# Patient Record
Sex: FEMALE | Race: WHITE | NOT HISPANIC OR LATINO | Employment: FULL TIME | URBAN - METROPOLITAN AREA
[De-identification: names, ages, dates, MRNs, and addresses within clinical notes are randomized per-mention and may not be internally consistent; named-entity substitution may affect disease eponyms.]

---

## 2017-05-12 ENCOUNTER — COMMUNICATION - HEALTHEAST (OUTPATIENT)
Dept: SCHEDULING | Facility: CLINIC | Age: 42
End: 2017-05-12

## 2017-05-24 ENCOUNTER — OFFICE VISIT - HEALTHEAST (OUTPATIENT)
Dept: INTERNAL MEDICINE | Facility: CLINIC | Age: 42
End: 2017-05-24

## 2017-05-24 DIAGNOSIS — G43.109 MIGRAINE HEADACHE WITH AURA: ICD-10-CM

## 2017-05-24 DIAGNOSIS — R10.9 ABDOMINAL PAIN: ICD-10-CM

## 2017-05-24 DIAGNOSIS — R63.4 WEIGHT LOSS: ICD-10-CM

## 2017-05-24 DIAGNOSIS — Z12.31 ENCOUNTER FOR SCREENING MAMMOGRAM FOR BREAST CANCER: ICD-10-CM

## 2017-05-24 DIAGNOSIS — K59.00 CONSTIPATION: ICD-10-CM

## 2017-05-24 DIAGNOSIS — Z00.00 ANNUAL PHYSICAL EXAM: ICD-10-CM

## 2017-05-24 DIAGNOSIS — E55.9 VITAMIN D DEFICIENCY: ICD-10-CM

## 2017-05-24 DIAGNOSIS — K64.9 HEMORRHOID: ICD-10-CM

## 2017-05-24 ASSESSMENT — MIFFLIN-ST. JEOR: SCORE: 969.07

## 2017-05-30 ENCOUNTER — COMMUNICATION - HEALTHEAST (OUTPATIENT)
Dept: INTERNAL MEDICINE | Facility: CLINIC | Age: 42
End: 2017-05-30

## 2017-05-30 ENCOUNTER — HOSPITAL ENCOUNTER (OUTPATIENT)
Dept: MAMMOGRAPHY | Facility: HOSPITAL | Age: 42
Discharge: HOME | End: 2017-05-30
Attending: INTERNAL MEDICINE

## 2017-05-30 DIAGNOSIS — Z12.31 ENCOUNTER FOR SCREENING MAMMOGRAM FOR BREAST CANCER: ICD-10-CM

## 2017-06-27 ENCOUNTER — RECORDS - HEALTHEAST (OUTPATIENT)
Dept: ADMINISTRATIVE | Facility: OTHER | Age: 42
End: 2017-06-27

## 2017-06-28 ENCOUNTER — RECORDS - HEALTHEAST (OUTPATIENT)
Dept: ADMINISTRATIVE | Facility: OTHER | Age: 42
End: 2017-06-28

## 2017-08-08 ENCOUNTER — RECORDS - HEALTHEAST (OUTPATIENT)
Dept: ADMINISTRATIVE | Facility: OTHER | Age: 42
End: 2017-08-08

## 2017-08-14 ENCOUNTER — COMMUNICATION - HEALTHEAST (OUTPATIENT)
Dept: INTERNAL MEDICINE | Facility: CLINIC | Age: 42
End: 2017-08-14

## 2017-08-25 ENCOUNTER — COMMUNICATION - HEALTHEAST (OUTPATIENT)
Dept: INTERNAL MEDICINE | Facility: CLINIC | Age: 42
End: 2017-08-25

## 2017-09-05 ENCOUNTER — COMMUNICATION - HEALTHEAST (OUTPATIENT)
Dept: INTERNAL MEDICINE | Facility: CLINIC | Age: 42
End: 2017-09-05

## 2021-05-24 ENCOUNTER — RECORDS - HEALTHEAST (OUTPATIENT)
Dept: ADMINISTRATIVE | Facility: CLINIC | Age: 46
End: 2021-05-24

## 2021-05-25 ENCOUNTER — RECORDS - HEALTHEAST (OUTPATIENT)
Dept: ADMINISTRATIVE | Facility: CLINIC | Age: 46
End: 2021-05-25

## 2021-05-29 ENCOUNTER — RECORDS - HEALTHEAST (OUTPATIENT)
Dept: ADMINISTRATIVE | Facility: CLINIC | Age: 46
End: 2021-05-29

## 2021-05-31 ENCOUNTER — RECORDS - HEALTHEAST (OUTPATIENT)
Dept: ADMINISTRATIVE | Facility: CLINIC | Age: 46
End: 2021-05-31

## 2021-05-31 VITALS — HEIGHT: 59 IN | WEIGHT: 92.25 LBS | BODY MASS INDEX: 18.6 KG/M2

## 2021-06-03 ENCOUNTER — RECORDS - HEALTHEAST (OUTPATIENT)
Dept: ADMINISTRATIVE | Facility: CLINIC | Age: 46
End: 2021-06-03

## 2021-06-10 NOTE — PROGRESS NOTES
[facility] Follow Up Note    Assessment/Plan:    1. Annual physical exam  Patient is up-to-date on Pap smear which was done in 2016 and was negative together was negative HPV.  She refused tetanus shot today.  Mammogram was ordered.  - Mammo Screening Bilateral; Future    2.  New onset constipation and weight loss  This is concerning.  Will check metabolic workup.  Since she has never had constipation before no change in her lifestyle or stress levels we did discuss doing a colonoscopy.  - HM1(CBC and Differential)  - Thyroid Stimulating Hormone (TSH)  - HM1 (CBC with Diff)  - Ambulatory referral for Colonoscopy    3. Hemorrhoid  She will use topical hydrocortisone cream as needed.  For constipation discussed using MiraLAX.  - HM1(CBC and Differential)  - HM1 (CBC with Diff)  - hydrocortisone 2.5 % cream; Apply twice a day as needed for 3-5 days  Dispense: 30 g; Refill: 0    4. Migraine headache with aura  Patient uses ibuprofen as needed    5.  Anxiety:  Patient looks very anxious and preoccupied.  Her  was in the room but did not encroach in our conversation.  She was not menacing but in the future would like to question her by herself to make sure she is safe at home.  She has been  for over 20 years and has 3 kids.  She also has a very low BMI and if blood work and colonoscopy are normal we can further discuss eating disorder.    Beronica Coffey MD    Chief Complaint:  Chief Complaint   Patient presents with     Establish Care     Hemorrhoids       History of Present Illness:  Francisco is a 41 y.o. female with history of tubal ligation, and migraines who is currently here to establish care and address the concern.  She is accompanied by her  and appears to be somewhat anxious and stressed.    Patient never had any gastrointestinal problems in the past and most recently developed hemorrhoid.  She describes occasional pain and blood was moving her bowels.  She does not know her  family history of very well but so far denies any family history of colon cancer.  She has never had hemorrhoids before.  She is also been constipated more for the last several months.  That has never happened before either.  She denies any new medications or change in diet.  She has intermittent hard stool but sometimes she has runny stools and she has bowel urgency to push for it to come out.  Over-the-counter she tried Preparation H for hemorrhoids but no laxatives.  Because of these problems she has not been eating very well and lost 9 pounds.  She is underweight to begin with.    Patient has history of migraines with aura (flashing lights.  She has been having them more frequently recently but they do respond well to ibuprofen.    Review of Systems:  A comprehensive review of systems was performed and was otherwise negative.  Patient has only occasional insomnia.  No recent fevers or chills.  Weight loss as above.  She tells me that she is stress only because of her health issues but otherwise denies any chronic depression or anxiety.  No chest pains palpitations or dizziness.  No shortness of breath or cough.  No urinary problems.  Her menstruations are regular and heavy only on the first 2 days.    PFSH:  Social History: Reviewed  History   Smoking Status     Never Smoker   Smokeless Tobacco     Not on file     Social History     Social History Narrative    Patient works as a .  She has 3 teenage children who still live in the house.  She is  for over 20 years.           Past History: Reviewed  Current Outpatient Prescriptions   Medication Sig Dispense Refill     hydrocortisone 2.5 % cream Apply twice a day as needed for 3-5 days 30 g 0     No current facility-administered medications for this visit.        Family History: Patient is not a very good historian but did reviewed with her    Physical Exam:    Vitals:    05/24/17 1234   BP: 96/64   Patient Site: Left Arm   Patient Position: Sitting  "  Cuff Size: Adult Regular   Pulse: 61   SpO2: 98%   Weight: (!) 92 lb 4 oz (41.8 kg)   Height: 4' 11\" (1.499 m)     Wt Readings from Last 3 Encounters:   05/24/17 (!) 92 lb 4 oz (41.8 kg)   11/20/16 (!) 98 lb (44.5 kg)   10/18/16 100 lb (45.4 kg)     Body mass index is 18.63 kg/(m^2).    Constitutional:  Reveals a pleasant frail female with anxious presentation.  Vitals:  Per nursing notes.  HEENT:No cervical LAD, no thyromegaly,  conjunctiva is pink, no scleral icterus, TMs are visualized and normal bl, oropharynx is clear, no exudates,   Cardiac:  Regular rate and rhythm,no murmurs, rubs, or gallops. Legs without edema. Palpation of the radial pulse regular.  Lungs: Clear to auscultation bl.  Respiratory effort normal.  Abdomen:positive BS, soft, nontender, nondistended.  No hepato-splenomagaly, mild epigastric tenderness without rebound.  Rectal exam: Moderate size deflated hemorrhoid, increased sphincter tone, no stool in the vault.  Skin:   Without rash, bruise, or palpable lesions.  Breast exam: Toney lymphadenopathy breast masses or skin changes appreciated.  Patient has somewhat fibrocystic breasts.  Psychiatric: affect is somewhat anxious and fearful, her  was in the room and was not encroaching on our conversation, memory intact.     Data Review:    Analysis and Summary of Old Records (2): Yes    Records Requested (1): No    Other History Summarized (from other people in the room) (2): No    Radiology Tests Summarized (XRAY/CT/MRI/DXA) (1): No    Labs Reviewed (1): Yes    Medicine Tests Reviewed (EKG/ECHO/COLONOSCOPY/EGD) (1): No    Independent Review of EKG or X-RAY (2): No      "

## 2022-09-17 ENCOUNTER — HOSPITAL ENCOUNTER (EMERGENCY)
Facility: CLINIC | Age: 47
Discharge: HOME | End: 2022-09-17
Attending: EMERGENCY MEDICINE | Admitting: EMERGENCY MEDICINE

## 2022-09-17 VITALS
BODY MASS INDEX: 19.44 KG/M2 | HEIGHT: 59 IN | WEIGHT: 96.4 LBS | HEART RATE: 86 BPM | DIASTOLIC BLOOD PRESSURE: 56 MMHG | TEMPERATURE: 99.3 F | OXYGEN SATURATION: 95 % | RESPIRATION RATE: 20 BRPM | SYSTOLIC BLOOD PRESSURE: 97 MMHG

## 2022-09-17 DIAGNOSIS — N10 ACUTE PYELONEPHRITIS: ICD-10-CM

## 2022-09-17 LAB
ALBUMIN UR-MCNC: 70 MG/DL
APPEARANCE UR: ABNORMAL
BACTERIA #/AREA URNS HPF: ABNORMAL /HPF
BILIRUB UR QL STRIP: NEGATIVE
COLOR UR AUTO: YELLOW
FLUAV RNA SPEC QL NAA+PROBE: NEGATIVE
FLUBV RNA RESP QL NAA+PROBE: NEGATIVE
GLUCOSE UR STRIP-MCNC: NEGATIVE MG/DL
HGB UR QL STRIP: ABNORMAL
HOLD SPECIMEN: NORMAL
KETONES UR STRIP-MCNC: 10 MG/DL
LEUKOCYTE ESTERASE UR QL STRIP: ABNORMAL
MUCOUS THREADS #/AREA URNS LPF: PRESENT /LPF
NITRATE UR QL: POSITIVE
PH UR STRIP: 6.5 [PH] (ref 5–7)
RBC URINE: 48 /HPF
RSV RNA SPEC NAA+PROBE: NEGATIVE
SARS-COV-2 RNA RESP QL NAA+PROBE: NEGATIVE
SP GR UR STRIP: 1.02 (ref 1–1.03)
UROBILINOGEN UR STRIP-MCNC: <2 MG/DL
WBC CLUMPS #/AREA URNS HPF: PRESENT /HPF
WBC URINE: >182 /HPF

## 2022-09-17 PROCEDURE — 87186 SC STD MICRODIL/AGAR DIL: CPT | Performed by: EMERGENCY MEDICINE

## 2022-09-17 PROCEDURE — 81001 URINALYSIS AUTO W/SCOPE: CPT | Performed by: EMERGENCY MEDICINE

## 2022-09-17 PROCEDURE — C9803 HOPD COVID-19 SPEC COLLECT: HCPCS

## 2022-09-17 PROCEDURE — 96375 TX/PRO/DX INJ NEW DRUG ADDON: CPT

## 2022-09-17 PROCEDURE — 96361 HYDRATE IV INFUSION ADD-ON: CPT

## 2022-09-17 PROCEDURE — 96374 THER/PROPH/DIAG INJ IV PUSH: CPT

## 2022-09-17 PROCEDURE — 87637 SARSCOV2&INF A&B&RSV AMP PRB: CPT | Performed by: EMERGENCY MEDICINE

## 2022-09-17 PROCEDURE — 99285 EMERGENCY DEPT VISIT HI MDM: CPT | Mod: CS,25

## 2022-09-17 PROCEDURE — 250N000011 HC RX IP 250 OP 636: Performed by: EMERGENCY MEDICINE

## 2022-09-17 PROCEDURE — 258N000003 HC RX IP 258 OP 636: Performed by: EMERGENCY MEDICINE

## 2022-09-17 RX ORDER — PIPERACILLIN SODIUM, TAZOBACTAM SODIUM 3; .375 G/15ML; G/15ML
3.38 INJECTION, POWDER, LYOPHILIZED, FOR SOLUTION INTRAVENOUS ONCE
Status: COMPLETED | OUTPATIENT
Start: 2022-09-17 | End: 2022-09-17

## 2022-09-17 RX ORDER — MORPHINE SULFATE 4 MG/ML
4 INJECTION, SOLUTION INTRAMUSCULAR; INTRAVENOUS EVERY 30 MIN PRN
Status: DISCONTINUED | OUTPATIENT
Start: 2022-09-17 | End: 2022-09-17 | Stop reason: HOSPADM

## 2022-09-17 RX ORDER — ONDANSETRON 2 MG/ML
4 INJECTION INTRAMUSCULAR; INTRAVENOUS ONCE
Status: COMPLETED | OUTPATIENT
Start: 2022-09-17 | End: 2022-09-17

## 2022-09-17 RX ORDER — OXYCODONE AND ACETAMINOPHEN 5; 325 MG/1; MG/1
1 TABLET ORAL EVERY 6 HOURS PRN
Qty: 12 TABLET | Refills: 0 | Status: SHIPPED | OUTPATIENT
Start: 2022-09-17 | End: 2022-09-20

## 2022-09-17 RX ORDER — CEPHALEXIN 500 MG/1
500 CAPSULE ORAL 4 TIMES DAILY
Qty: 28 CAPSULE | Refills: 0 | Status: SHIPPED | OUTPATIENT
Start: 2022-09-17 | End: 2022-09-24

## 2022-09-17 RX ADMIN — MORPHINE SULFATE 4 MG: 4 INJECTION, SOLUTION INTRAMUSCULAR; INTRAVENOUS at 17:36

## 2022-09-17 RX ADMIN — PIPERACILLIN AND TAZOBACTAM 3.38 G: 3; .375 INJECTION, POWDER, FOR SOLUTION INTRAVENOUS at 17:47

## 2022-09-17 RX ADMIN — ONDANSETRON 4 MG: 2 INJECTION INTRAMUSCULAR; INTRAVENOUS at 17:35

## 2022-09-17 RX ADMIN — SODIUM CHLORIDE 1000 ML: 9 INJECTION, SOLUTION INTRAVENOUS at 17:35

## 2022-09-17 ASSESSMENT — ACTIVITIES OF DAILY LIVING (ADL): ADLS_ACUITY_SCORE: 33

## 2022-09-17 ASSESSMENT — ENCOUNTER SYMPTOMS
DYSURIA: 0
ARTHRALGIAS: 1
ABDOMINAL PAIN: 0
MYALGIAS: 1
FEVER: 1

## 2022-09-17 NOTE — ED TRIAGE NOTES
Patient reports 3 days of body aches, pain to ribs, head, and legs. Fever x2 days. On arrival 101.7. Has not had tylenol or ibuprofen today. Last emesis 2 days ago.  Urinary frequency     Triage Assessment     Row Name 09/17/22 1312       Triage Assessment (Adult)    Airway WDL WDL       Respiratory WDL    Respiratory WDL WDL       Skin Circulation/Temperature WDL    Skin Circulation/Temperature WDL WDL       Cardiac WDL    Cardiac WDL WDL       Peripheral/Neurovascular WDL    Peripheral Neurovascular WDL WDL       Cognitive/Neuro/Behavioral WDL    Cognitive/Neuro/Behavioral WDL WDL

## 2022-09-17 NOTE — Clinical Note
Francisco Alfredo was seen and treated in our emergency department on 9/17/2022.  She may return to work on 09/20/2022.       If you have any questions or concerns, please don't hesitate to call.      Johny Dexter MD

## 2022-09-17 NOTE — ED NOTES
Introduced self to patient.  Whiteboard updated.  Plan of care and length of time discussed with patient.  Will continue to monitor. Carolyn Carranza RN.......9/17/2022 6:42 PM

## 2022-09-17 NOTE — ED PROVIDER NOTES
EMERGENCY DEPARTMENT ENCOUNTER      NAME: Francisco Alfredo  AGE: 46 year old female  YOB: 1975  MRN: [id number]  EVALUATION DATE & TIME: 2022  4:48 PM    PCP: Lucy Arnett    ED PROVIDER: Johny Dexter M.D.      Chief Complaint   Patient presents with     Fever     Generalized Body Aches     Vomiting         FINAL IMPRESSION:  1.  Acute pyelonephritis.      ED COURSE & MEDICAL DECISION MAKIN:22 PM I met with the patient to gather history and to perform my initial exam. We discussed plans for the ED course, including diagnostic testing and treatment. PPE worn: cloth mask.  Patient notes frequency of urination, some nausea and vomiting 2 days ago.  Fever over the last 2 days and generalized achiness and back pain over the last 3 days.  Laboratory ordered from triage shows a negative influenza and COVID testing.  Patient however has a extensive urinary tract infection which is both nitrite leukocyte esterase positive and over 100 white cells.  Patient has an IV and we ordered IV fluids, Zofran and morphine, and 1 dose of antibiotics before we will discharge her to home.  Patient in agreement with the plan.  6:33 PM.  Antibiotic and IV fluids have infused.  Patient received received medications.  Patient feeling better and will be discharged to home.  Short-term prescription for Percocet, and prescription for cephalexin.  She is to see her clinic later on this next week and recheck her urine.  Patient in agreement with the plan.    Pertinent Labs & Imaging studies reviewed. (See chart for details)    46 year old female presents to the Emergency Department for evaluation of fever and back pain.    At the conclusion of the encounter I discussed the results of all of the tests and the disposition. The questions were answered. The patient or family acknowledged understanding and was agreeable with the care plan.     MEDICATIONS GIVEN IN THE EMERGENCY:  Medications - No data to  display    NEW PRESCRIPTIONS STARTED AT TODAY'S ER VISIT  New Prescriptions    No medications on file          =================================================================    HPI    Patient information was obtained from: Patient     Use of : N/A         Francisco Alfredo is a 46 year old female with no contributory history who presents to this ED via private vehicle for evaluation of body aches and fever.    Patient reports that she has had increased urgency and frequency to urinate as well as body aches and fever for the past 2-3 days. She notes that she has had nausea and a few episodes of vomiting (last occurrence was 2 days ago). Patient denies change of pregnancy. Patient denies dysuria, abdominal pain, or additional symptoms or complaints at this time.     She does not identify any waxing or waning symptoms otherwise, exacerbating or alleviating features,associated symptoms except as mentioned.    REVIEW OF SYSTEMS   Review of Systems   Constitutional: Positive for fever.   Gastrointestinal: Negative for abdominal pain.   Genitourinary: Positive for urgency. Negative for dysuria.   Musculoskeletal: Positive for arthralgias and myalgias.   All other systems reviewed and are negative.       PAST MEDICAL HISTORY:  Past Medical History:   Diagnosis Date     Acute costochondritis      Ectopic pregnancy     left     NO ACTIVE PROBLEMS        PAST SURGICAL HISTORY:  Past Surgical History:   Procedure Laterality Date     TUBAL LIGATION  2012    approximate     TUBAL LIGATION      left            CURRENT MEDICATIONS:    cyclobenzaprine (FLEXERIL) 5 MG tablet  methocarbamol (ROBAXIN) 750 MG tablet        ALLERGIES:  Allergies   Allergen Reactions     Nkda [No Known Drug Allergies]        FAMILY HISTORY:  Family History   Problem Relation Age of Onset     Hypertension Father      Diabetes No family hx of      Coronary Artery Disease No family hx of      Cerebrovascular Disease No family hx of       "Breast Cancer No family hx of      Colon Cancer No family hx of      Prostate Cancer No family hx of      Other Cancer No family hx of      Asthma No family hx of      Cancer Paternal Uncle        SOCIAL HISTORY:   Social History     Socioeconomic History     Marital status:    Tobacco Use     Smoking status: Never Smoker   Substance and Sexual Activity     Alcohol use: Yes     Alcohol/week: 0.0 standard drinks     Comment: Alcoholic Drinks/day: rare     Drug use: No     Sexual activity: Yes     Partners: Male   Social History Narrative    Patient works as a .  She has 3 teenage children who still live in the house.  She is  for over 20 years.    She enjoys walking and cardio 4 times per week for exercise.      Rare alcohol.  No drugs or tobacco.    VITALS:  /77   Pulse 93   Temp (!) 101.7  F (38.7  C) (Oral)   Resp 20   Ht 1.499 m (4' 11\")   Wt 43.7 kg (96 lb 6.4 oz)   LMP 08/01/2022   SpO2 96%   BMI 19.47 kg/m      PHYSICAL EXAM    Vital Signs:  /77   Pulse 93   Temp (!) 101.7  F (38.7  C) (Oral)   Resp 20   Ht 1.499 m (4' 11\")   Wt 43.7 kg (96 lb 6.4 oz)   LMP 08/01/2022   SpO2 96%   BMI 19.47 kg/m    General:  On entering the room patient is in no apparent distress.    Neck:  Neck supple with full range of motion and nontender.    Back:  Back and spine are nontender.  Bilateral costovertebral angle tenderness.    HEENT:  Oropharynx clear with moist mucous membranes.  HEENT unremarkable.    Pulmonary:  Chest clear to auscultation without rhonchi rales or wheezing.    Cardiovascular:  Cardiac regular rate and rhythm without murmurs rubs or gallops.    Abdomen:  Abdomen soft nontender.  There is no rebound or guarding.    Muskuloskeletal:  she moves all 4 without any difficulty and has normal neurovascular exams.  Extremities without clubbing, cyanosis, or edema.  Legs and calves are nontender.    Neuro:  she is alert and oriented ×3 and moves all extremities " symmetrically.    Psych:  Normal affect.    Skin:  Unremarkable and warm and dry.       LAB:  All pertinent labs reviewed and interpreted.  Labs Ordered and Resulted from Time of ED Arrival to Time of ED Departure   ROUTINE UA WITH MICROSCOPIC REFLEX TO CULTURE - Abnormal       Result Value    Color Urine Yellow      Appearance Urine Turbid (*)     Glucose Urine Negative      Bilirubin Urine Negative      Ketones Urine 10  (*)     Specific Gravity Urine 1.017      Blood Urine 1.0 mg/dL (*)     pH Urine 6.5      Protein Albumin Urine 70  (*)     Urobilinogen Urine <2.0      Nitrite Urine Positive (*)     Leukocyte Esterase Urine 500 Yasmani/uL (*)     Bacteria Urine Few (*)     WBC Clumps Urine Present (*)     Mucus Urine Present (*)     RBC Urine 48 (*)     WBC Urine >182 (*)    INFLUENZA A/B & SARS-COV2 PCR MULTIPLEX - Normal    Influenza A PCR Negative      Influenza B PCR Negative      RSV PCR Negative      SARS CoV2 PCR Negative     URINE CULTURE       RADIOLOGY:  Reviewed all pertinent imaging. Please see official radiology report.  No orders to display              EKG:            PROCEDURES:         I, Vikash Bailey, am serving as a scribe to document services personally performed by Dr. Dexter based on my observation and the provider's statements to me. I, Johny Dexter MD attest that Vikash Bailey is acting in a scribe capacity, has observed my performance of the services and has documented them in accordance with my direction.    Johny Dexter M.D.  Emergency Medicine  [facility] EMERGENCY ROOM  [address] [id number] [phone]  Dept: [phone]     Johny Dexter MD  09/17/22 4520

## 2022-09-17 NOTE — DISCHARGE INSTRUCTIONS
Encourage rest and fluids.  Ice or heat off-and-on to sore areas can help with pain.  Over-the-counter Tylenol or ibuprofen every 6 hours can help with pain.  1 Percocet or 6 hours instead only if needed for stronger pain.  Do not drive with this.  Have someone drive you home today.  Cephalexin as prescribed.  You must see your clinic doctor later this week and have them recheck your urine.  See them sooner or return if worse or problems.

## 2022-09-19 LAB — BACTERIA UR CULT: ABNORMAL

## 2023-02-27 ENCOUNTER — OFFICE VISIT (OUTPATIENT)
Dept: FAMILY MEDICINE | Facility: CLINIC | Age: 48
End: 2023-02-27

## 2023-02-27 VITALS
RESPIRATION RATE: 14 BRPM | WEIGHT: 98.56 LBS | TEMPERATURE: 98 F | HEART RATE: 76 BPM | OXYGEN SATURATION: 98 % | DIASTOLIC BLOOD PRESSURE: 79 MMHG | SYSTOLIC BLOOD PRESSURE: 109 MMHG | BODY MASS INDEX: 19.91 KG/M2

## 2023-02-27 DIAGNOSIS — R11.2 NAUSEA AND VOMITING, UNSPECIFIED VOMITING TYPE: Primary | ICD-10-CM

## 2023-02-27 DIAGNOSIS — K52.9 GASTROENTERITIS: ICD-10-CM

## 2023-02-27 LAB
ALBUMIN SERPL BCG-MCNC: 3.7 G/DL (ref 3.5–5.2)
ALP SERPL-CCNC: 90 U/L (ref 35–104)
ALT SERPL W P-5'-P-CCNC: 87 U/L (ref 10–35)
ANION GAP SERPL CALCULATED.3IONS-SCNC: 12 MMOL/L (ref 7–15)
AST SERPL W P-5'-P-CCNC: 43 U/L (ref 10–35)
BASOPHILS # BLD AUTO: 0 10E3/UL (ref 0–0.2)
BASOPHILS NFR BLD AUTO: 0 %
BILIRUB SERPL-MCNC: 0.4 MG/DL
BUN SERPL-MCNC: 10.8 MG/DL (ref 6–20)
CALCIUM SERPL-MCNC: 9 MG/DL (ref 8.6–10)
CHLORIDE SERPL-SCNC: 96 MMOL/L (ref 98–107)
CREAT SERPL-MCNC: 0.68 MG/DL (ref 0.51–0.95)
DEPRECATED HCO3 PLAS-SCNC: 24 MMOL/L (ref 22–29)
EOSINOPHIL # BLD AUTO: 0 10E3/UL (ref 0–0.7)
EOSINOPHIL NFR BLD AUTO: 0 %
ERYTHROCYTE [DISTWIDTH] IN BLOOD BY AUTOMATED COUNT: 12 % (ref 10–15)
GFR SERPL CREATININE-BSD FRML MDRD: >90 ML/MIN/1.73M2
GLUCOSE SERPL-MCNC: 106 MG/DL (ref 70–99)
HCT VFR BLD AUTO: 41.9 % (ref 35–47)
HGB BLD-MCNC: 14.5 G/DL (ref 11.7–15.7)
IMM GRANULOCYTES # BLD: 0 10E3/UL
IMM GRANULOCYTES NFR BLD: 0 %
LYMPHOCYTES # BLD AUTO: 1 10E3/UL (ref 0.8–5.3)
LYMPHOCYTES NFR BLD AUTO: 8 %
MCH RBC QN AUTO: 30.8 PG (ref 26.5–33)
MCHC RBC AUTO-ENTMCNC: 34.6 G/DL (ref 31.5–36.5)
MCV RBC AUTO: 89 FL (ref 78–100)
MONOCYTES # BLD AUTO: 0.9 10E3/UL (ref 0–1.3)
MONOCYTES NFR BLD AUTO: 7 %
NEUTROPHILS # BLD AUTO: 10.6 10E3/UL (ref 1.6–8.3)
NEUTROPHILS NFR BLD AUTO: 85 %
PLATELET # BLD AUTO: 199 10E3/UL (ref 150–450)
POTASSIUM SERPL-SCNC: 3.9 MMOL/L (ref 3.4–5.3)
PROT SERPL-MCNC: 7.7 G/DL (ref 6.4–8.3)
RBC # BLD AUTO: 4.71 10E6/UL (ref 3.8–5.2)
SODIUM SERPL-SCNC: 132 MMOL/L (ref 136–145)
WBC # BLD AUTO: 12.5 10E3/UL (ref 4–11)

## 2023-02-27 PROCEDURE — 36415 COLL VENOUS BLD VENIPUNCTURE: CPT | Performed by: PHYSICIAN ASSISTANT

## 2023-02-27 PROCEDURE — 85025 COMPLETE CBC W/AUTO DIFF WBC: CPT | Performed by: PHYSICIAN ASSISTANT

## 2023-02-27 PROCEDURE — 99204 OFFICE O/P NEW MOD 45 MIN: CPT | Performed by: PHYSICIAN ASSISTANT

## 2023-02-27 PROCEDURE — 80053 COMPREHEN METABOLIC PANEL: CPT | Performed by: PHYSICIAN ASSISTANT

## 2023-02-27 RX ORDER — ONDANSETRON 4 MG/1
4 TABLET, ORALLY DISINTEGRATING ORAL EVERY 8 HOURS PRN
Qty: 10 TABLET | Refills: 0 | Status: SHIPPED | OUTPATIENT
Start: 2023-02-27

## 2023-02-27 RX ORDER — ONDANSETRON 4 MG/1
4 TABLET, ORALLY DISINTEGRATING ORAL ONCE
Status: COMPLETED | OUTPATIENT
Start: 2023-02-27 | End: 2023-02-27

## 2023-02-27 RX ADMIN — ONDANSETRON 4 MG: 4 TABLET, ORALLY DISINTEGRATING ORAL at 19:16

## 2023-02-27 NOTE — LETTER
[facility]  [address] [id number] [phone]      February 27, 2023    RE:  Francisco Alfredo                                                                                                                                                       [address] [place] [address]            To whom it may concern:    Francisco Alfredo is under my professional care.  Please excuse them from work until symptoms improve      Sincerely,        OG Ramos [facility]

## 2023-02-28 NOTE — PATIENT INSTRUCTIONS
Pedialyte, fluids    If your symptoms worsen or you are not able to keep fluids down with Zofran go to the ER
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
